# Patient Record
Sex: MALE | Race: WHITE | Employment: FULL TIME | URBAN - METROPOLITAN AREA
[De-identification: names, ages, dates, MRNs, and addresses within clinical notes are randomized per-mention and may not be internally consistent; named-entity substitution may affect disease eponyms.]

---

## 2022-01-02 ENCOUNTER — HOSPITAL ENCOUNTER (EMERGENCY)
Age: 22
Discharge: HOME | End: 2022-01-02
Attending: EMERGENCY MEDICINE
Payer: COMMERCIAL

## 2022-01-02 VITALS
WEIGHT: 225 LBS | HEART RATE: 83 BPM | DIASTOLIC BLOOD PRESSURE: 97 MMHG | TEMPERATURE: 98 F | OXYGEN SATURATION: 97 % | RESPIRATION RATE: 17 BRPM | SYSTOLIC BLOOD PRESSURE: 143 MMHG

## 2022-01-02 DIAGNOSIS — F41.1 ANXIETY STATE: Primary | ICD-10-CM

## 2022-01-02 PROCEDURE — 99282 EMERGENCY DEPT VISIT SF MDM: CPT

## 2022-01-02 PROCEDURE — 74011250637 HC RX REV CODE- 250/637: Performed by: EMERGENCY MEDICINE

## 2022-01-02 RX ORDER — LORAZEPAM 0.5 MG/1
0.5 TABLET ORAL
Status: DISCONTINUED | OUTPATIENT
Start: 2022-01-02 | End: 2022-01-02

## 2022-01-02 RX ORDER — LORAZEPAM 0.5 MG/1
1 TABLET ORAL
Qty: 6 TABLET | Refills: 0 | Status: SHIPPED | OUTPATIENT
Start: 2022-01-02

## 2022-01-02 RX ORDER — LORAZEPAM 1 MG/1
1 TABLET ORAL
Status: COMPLETED | OUTPATIENT
Start: 2022-01-02 | End: 2022-01-02

## 2022-01-02 RX ADMIN — LORAZEPAM 1 MG: 1 TABLET ORAL at 01:54

## 2022-01-02 NOTE — BSMART NOTE
Comprehensive Assessment Form Part 1    Section I - Disposition  Unspecified Anxiety  ADHD by history  Unspecified Depression      The Medical Doctor to Psychiatrist conference was not completed. The Medical Doctor is in agreement with Psychiatrist disposition because of (reason) Patient does not require psychiatric admission at this time. The plan is medicate in ER, give small prescription until patient can see someone in [place]. Crisis information for  [place] and [facility] and [facility]. The on-call Psychiatrist consulted was Dr. Kye Beckman . The admitting Psychiatrist will be Dr. Kye Beckman  The admitting Diagnosis is  NA  The Payor source is      Section II - Integrated Summary  Summary:  Patient came in accompanied by mother for mental health evaluation. Patient reported that he has had recent trauma of his roommate killing himself in last 6 months and yesterday was his grandfather's .  Patient reported that 2 days ago he had a panic attack at [facility] and had to go lay down in the car and all day today he feels as if he has been having running thoughts and has been talking himself out of having another panic attack. Patient also reporting feeling he is disassociating and outside of reality. Patient reported a history of depression, ADHD, and anxiety. He reported he has had two episodes of anxiety in past induced by smoking marijuana, one of which he was seen in an ER, medicated, and observed. Patient reported he has been prescribed medications in the past on two different occasions. Patient thought one of them might have been Prozac. He reported he took them briefly and then stopped. He also reported participating briefly in therapy in past.   Patient currently takes something that begins with an \"M\" for his ADHD. No prior psychiatric hospitalizations in past.      Patient presents as alert, oriented, and cooperative.  Speech is linear and logical.   Mood is anxious and affect is constricted. Patient reported overall his mood has been \"totally fine\" until a few days ago when the anxiety began. Patient denied any appetite or sleep disturbances other than being unable to sleep tonight. No hallucinations reported and patient does not appear to be. Patient denied current suicidal and homicidal ideation. Patient reported he is scared that he might but has no plan or intention of harming himself. Patient reported in the past when smoking marijuana he poked himself in the abdomen because he was having thoughts that he needed to sacrifice himself. Patient denied any past history of violence. Patient reported he has multiple supportive people in his life to include his girlfriend, mother, sister, dad and brother. Patient lives in [place] for school and was supposed to fly back tomorrow but canceled the flight and mother will be taking him back and with him for a couple of days. Patient is amenable to getting outpatient psychiatric services and therapy at this time. Re-evaluated patient after 1/2 mg of Ativan given and he indicated the anxiety was improving. Discussed follow up and patient agreed. Advised patient to return if condition deteriorates. The patient is deemed competent to provide informed consent. The information is given by the patient. The Chief Complaint is anxiety. The Precipitant Factors are loss of grandfather and roommate. Previous Hospitalizations: NA  The patient has not previously been in restraints. Current Psychiatrist and/or  is NA. Lethality Assessment:    The potential for suicide is noted by the following: not noted. The potential for homicide is not noted. The patient has not been a perpetrator of sexual or physical abuse. There are not pending charges. The patient is not felt to be at risk for self harm or harm to others.   The attending nurse was advised that security has not been notified. Section III - Psychosocial  The patient's overall mood and attitude is anxious. Feelings of helplessness and hopelessness are observed by verbal statements. Generalized anxiety is not observed. Panic is not observed. Phobias are not observed. Obsessive compulsive tendencies are not observed. Section IV - Mental Status Exam  The patient's appearance shows no evidence of impairment. The patient's behavior shows no evidence of impairment. The patient is oriented to time, place, person and situation. The patient's speech shows no evidence of impairment. The patient's mood  is anxious. The range of affect is constricted. The patient's thought content  demonstrates no evidence of impairment. The thought process shows no evidence of impairment. The patient's perception shows no evidence of impairment. The patient's memory shows no evidence of impairment. The patient's appetite shows no evidence of impairment. The patient's sleep shows no evidence of impairment. The patient shows little insight. The patient's judgement is psychologically impaired. Section V - Substance Abuse  The patient is using substances. The patient is using alcohol for 1-5 years with last use on New Year's. The patient has experienced the following withdrawal symptoms,  N/A. Patient denied current drug use but has history of marijuana use. Section VI - Living Arrangements  The patient is single. The patient lives with a parent. The patient has no children. The patient does plan to return home upon discharge. The patient does not have legal issues pending. The patient's source of income comes from family. Advent and cultural practices have not been voiced at this time. The patient's greatest support comes from family and [facility] and this person will not be involved with the treatment.     The patient has been in an event described as horrible or outside the realm of ordinary life experience either currently or in the past.  The patient has not been a victim of sexual/physical abuse. Section VII - Other Areas of Clinical Concern  The highest grade achieved is some college with the overall quality of school experience being described as NA. The patient is currently  student and speaks [place] as a primary language. The patient has no communication impairments affecting communication. The patient's preference for learning can be described as: can read and write adequately.   The patient's hearing is normal.  The patient's vision is normal .      Layla Muro, NILS

## 2022-01-02 NOTE — ED PROVIDER NOTES
Pt. States has felt that he is on the brink of a panic attack all day, states has hx of anxiety but does not take medications for anxiety, states has had a lot of recent trauma, his roommate killed himself in the patients appt in the last six months and just buried his grandfather yesterday. Denies suicidal or homicidal ideation. 50-year-old male presenting ER with report of overwhelming stress and anxiety. Patient reports that he has had a lot of stressors and trauma recently, his roommate recently killed himself in their apartment in the last 6 months and he just buried his grandfather. Patient denies any suicidal thoughts or homicidal thoughts but has an overwhelming feeling of dread and worry. Patient reports having difficulty completing daily tasks as result result. Patient denies any drug use other than marijuana no alcohol use. No past medical history on file. No past surgical history on file. No family history on file. Social History     Socioeconomic History    Marital status: SINGLE     Spouse name: Not on file    Number of children: Not on file    Years of education: Not on file    Highest education level: Not on file   Occupational History    Not on file   Tobacco Use    Smoking status: Not on file    Smokeless tobacco: Not on file   Substance and Sexual Activity    Alcohol use: Not on file    Drug use: Not on file    Sexual activity: Not on file   Other Topics Concern    Not on file   Social History Narrative    Not on file     Social Determinants of Health     Financial Resource Strain:     Difficulty of Paying Living Expenses: Not on file   Food Insecurity:     Worried About Running Out of Food in the Last Year: Not on file    Denys of Food in the Last Year: Not on file   Transportation Needs:     Lack of Transportation (Medical): Not on file    Lack of Transportation (Non-Medical):  Not on file   Physical Activity:     Days of Exercise per Week: Not on file   [employer] of Exercise per Session: Not on file   Stress:     Feeling of Stress : Not on file   Social Connections:     Frequency of Communication with Friends and Family: Not on file    Frequency of Social Gatherings with Friends and Family: Not on file    Attends Anabaptist Services: Not on file    Active Member of Clubs or Organizations: Not on file    Attends Club or Organization Meetings: Not on file    Marital Status: Not on file   Intimate Partner Violence:     Fear of Current or Ex-Partner: Not on file    Emotionally Abused: Not on file    Physically Abused: Not on file    Sexually Abused: Not on file   Housing Stability:     Unable to Pay for Housing in the Last Year: Not on file    Number of [place] in the Last Year: Not on file    Unstable Housing in the Last Year: Not on file         ALLERGIES: Patient has no known allergies. Review of Systems   Constitutional: Negative for chills and fever. HENT: Negative for congestion and sore throat. Eyes: Negative for pain. Respiratory: Negative for shortness of breath. Cardiovascular: Negative for chest pain. Gastrointestinal: Negative for abdominal pain, diarrhea, nausea and vomiting. Genitourinary: Negative for dysuria and flank pain. Musculoskeletal: Negative for back pain and neck pain. Skin: Negative for rash. Neurological: Negative for dizziness and headaches. Psychiatric/Behavioral: Negative for self-injury, sleep disturbance and suicidal ideas. The patient is nervous/anxious. All other systems reviewed and are negative. Vitals:    01/02/22 0047   BP: (!) 143/97   Pulse: 83   Resp: 17   Temp: 98 °F (36.7 °C)   SpO2: 97%   Weight: 102.1 kg (225 lb)            Physical Exam  Constitutional:       Appearance: He is well-developed. HENT:      Head: Normocephalic. Eyes:      Conjunctiva/sclera: Conjunctivae normal.   Cardiovascular:      Rate and Rhythm: Normal rate and regular rhythm.    Pulmonary: Effort: Pulmonary effort is normal. No respiratory distress. Breath sounds: Normal breath sounds. Abdominal:      General: Bowel sounds are normal.      Palpations: Abdomen is soft. Tenderness: There is no abdominal tenderness. Musculoskeletal:         General: Normal range of motion. Cervical back: Normal range of motion and neck supple. Skin:     General: Skin is warm. Capillary Refill: Capillary refill takes less than 2 seconds. Findings: No rash. Neurological:      Mental Status: He is alert and oriented to person, place, and time. Comments: No gross motor or sensory deficits   Psychiatric:         Mood and Affect: Mood is anxious. Thought Content: Thought content is not paranoid or delusional.          MDM  Number of Diagnoses or Management Options  Anxiety state  Diagnosis management comments: Had mental health team seen and evaluated the patient. Patient does not meet grounds for inpatient hospitalization however think that patient would benefit from short course of Ativan until he can be established with provider when he is home in [place]. Discussed the discharge impression and any labs and the results with the patient. Answered any questions and addressed any concerns. Discussed the importance of following up with their primary care provider and/or specialist.  Discussed signs or symptoms that would warrant return back to the ER for further evaluation. The patient is agreeable with discharge.            Procedures

## 2022-01-02 NOTE — ED TRIAGE NOTES
Pt.  has felt that he is on the brink of a panic attack all day,  has hx of anxiety but does not take medications for anxiety,  has had a lot of recent trauma, his roommate killed himself in the patients appt in the last six months and just buried his grandfather yesterday. Denies suicidal or homicidal ideation.

## 2023-05-15 RX ORDER — LORAZEPAM 0.5 MG/1
1 TABLET ORAL EVERY 8 HOURS PRN
COMMUNITY
Start: 2022-01-02